# Patient Record
Sex: FEMALE | Race: WHITE | NOT HISPANIC OR LATINO | Employment: FULL TIME | URBAN - METROPOLITAN AREA
[De-identification: names, ages, dates, MRNs, and addresses within clinical notes are randomized per-mention and may not be internally consistent; named-entity substitution may affect disease eponyms.]

---

## 2018-04-25 ENCOUNTER — CONVERSION ENCOUNTER (OUTPATIENT)
Dept: MAMMOGRAPHY | Facility: HOSPITAL | Age: 48
End: 2018-04-25

## 2018-11-13 ENCOUNTER — OFFICE VISIT CONVERTED (OUTPATIENT)
Dept: OTHER | Facility: HOSPITAL | Age: 48
End: 2018-11-13
Attending: NURSE PRACTITIONER

## 2019-01-03 ENCOUNTER — HOSPITAL ENCOUNTER (OUTPATIENT)
Dept: OTHER | Facility: HOSPITAL | Age: 49
Discharge: HOME | End: 2019-01-03
Attending: NURSE PRACTITIONER

## 2019-01-03 LAB
T4 FREE SERPL-MCNC: 1 NG/DL (ref 0.9–1.8)
TSH SERPL-ACNC: 5.18 M[IU]/L (ref 0.27–4.2)

## 2019-01-07 LAB
CONV ANTI MICROSOMAL AB: 344 IU/ML (ref 0–34)
THYROGLOBULIN ANTIBODY: 3.9 IU/ML (ref 0–0.9)

## 2019-04-15 ENCOUNTER — CONVERSION ENCOUNTER (OUTPATIENT)
Dept: OTHER | Facility: HOSPITAL | Age: 49
End: 2019-04-15

## 2019-04-15 ENCOUNTER — HOSPITAL ENCOUNTER (OUTPATIENT)
Dept: OTHER | Facility: HOSPITAL | Age: 49
Discharge: HOME | End: 2019-04-15
Attending: NURSE PRACTITIONER

## 2019-04-15 ENCOUNTER — OFFICE VISIT CONVERTED (OUTPATIENT)
Dept: OTHER | Facility: HOSPITAL | Age: 49
End: 2019-04-15
Attending: NURSE PRACTITIONER

## 2019-04-16 LAB
T4 FREE SERPL-MCNC: 1.6 NG/DL (ref 0.9–1.8)
TSH SERPL-ACNC: 0.42 M[IU]/L (ref 0.27–4.2)

## 2019-05-29 ENCOUNTER — CONVERSION ENCOUNTER (OUTPATIENT)
Dept: OTHER | Facility: HOSPITAL | Age: 49
End: 2019-05-29

## 2019-05-29 ENCOUNTER — OFFICE VISIT CONVERTED (OUTPATIENT)
Dept: OTHER | Facility: HOSPITAL | Age: 49
End: 2019-05-29
Attending: NURSE PRACTITIONER

## 2019-07-03 ENCOUNTER — OFFICE VISIT CONVERTED (OUTPATIENT)
Dept: OTHER | Facility: HOSPITAL | Age: 49
End: 2019-07-03
Attending: NURSE PRACTITIONER

## 2019-07-03 ENCOUNTER — CONVERSION ENCOUNTER (OUTPATIENT)
Dept: OTHER | Facility: HOSPITAL | Age: 49
End: 2019-07-03

## 2019-08-07 ENCOUNTER — HOSPITAL ENCOUNTER (OUTPATIENT)
Dept: MAMMOGRAPHY | Facility: HOSPITAL | Age: 49
Discharge: HOME | End: 2019-08-07
Attending: NURSE PRACTITIONER

## 2019-08-19 ENCOUNTER — HOSPITAL ENCOUNTER (OUTPATIENT)
Dept: MAMMOGRAPHY | Facility: HOSPITAL | Age: 49
Discharge: HOME | End: 2019-08-19
Attending: NURSE PRACTITIONER

## 2019-09-10 ENCOUNTER — OFFICE VISIT CONVERTED (OUTPATIENT)
Dept: GASTROENTEROLOGY | Facility: CLINIC | Age: 49
End: 2019-09-10
Attending: PHYSICIAN ASSISTANT

## 2019-09-25 ENCOUNTER — HOSPITAL ENCOUNTER (OUTPATIENT)
Dept: GASTROENTEROLOGY | Facility: HOSPITAL | Age: 49
Setting detail: HOSPITAL OUTPATIENT SURGERY
Discharge: HOME | End: 2019-09-25
Attending: INTERNAL MEDICINE

## 2019-12-26 ENCOUNTER — OFFICE VISIT CONVERTED (OUTPATIENT)
Dept: GASTROENTEROLOGY | Facility: CLINIC | Age: 49
End: 2019-12-26
Attending: PHYSICIAN ASSISTANT

## 2020-01-22 ENCOUNTER — CONVERSION ENCOUNTER (OUTPATIENT)
Dept: OTHER | Facility: HOSPITAL | Age: 50
End: 2020-01-22

## 2020-01-22 ENCOUNTER — OFFICE VISIT CONVERTED (OUTPATIENT)
Dept: OTHER | Facility: HOSPITAL | Age: 50
End: 2020-01-22
Attending: NURSE PRACTITIONER

## 2020-01-22 ENCOUNTER — HOSPITAL ENCOUNTER (OUTPATIENT)
Dept: OTHER | Facility: HOSPITAL | Age: 50
Discharge: HOME | End: 2020-01-22
Attending: NURSE PRACTITIONER

## 2020-01-22 LAB
ALBUMIN SERPL-MCNC: 4.2 G/DL (ref 3.5–5)
ALBUMIN/GLOB SERPL: 1.4 {RATIO} (ref 1.4–2.6)
ALP SERPL-CCNC: 91 U/L (ref 53–141)
ALT SERPL-CCNC: 11 U/L (ref 10–40)
ANION GAP SERPL CALC-SCNC: 14 MMOL/L (ref 8–19)
AST SERPL-CCNC: 17 U/L (ref 15–50)
BASOPHILS # BLD AUTO: 0.05 10*3/UL (ref 0–0.2)
BASOPHILS NFR BLD AUTO: 0.9 % (ref 0–3)
BILIRUB SERPL-MCNC: 0.29 MG/DL (ref 0.2–1.3)
BUN SERPL-MCNC: 6 MG/DL (ref 5–25)
BUN/CREAT SERPL: 9 {RATIO} (ref 6–20)
CALCIUM SERPL-MCNC: 9 MG/DL (ref 8.7–10.4)
CHLORIDE SERPL-SCNC: 104 MMOL/L (ref 99–111)
CHOLEST SERPL-MCNC: 167 MG/DL (ref 107–200)
CHOLEST/HDLC SERPL: 3.4 {RATIO} (ref 3–6)
CONV ABS IMM GRAN: 0.01 10*3/UL (ref 0–0.2)
CONV CO2: 27 MMOL/L (ref 22–32)
CONV IMMATURE GRAN: 0.2 % (ref 0–1.8)
CONV TOTAL PROTEIN: 7.1 G/DL (ref 6.3–8.2)
CREAT UR-MCNC: 0.66 MG/DL (ref 0.5–0.9)
DEPRECATED RDW RBC AUTO: 46.2 FL (ref 36.4–46.3)
EOSINOPHIL # BLD AUTO: 0.13 10*3/UL (ref 0–0.7)
EOSINOPHIL # BLD AUTO: 2.3 % (ref 0–7)
ERYTHROCYTE [DISTWIDTH] IN BLOOD BY AUTOMATED COUNT: 12.6 % (ref 11.7–14.4)
GFR SERPLBLD BASED ON 1.73 SQ M-ARVRAT: >60 ML/MIN/{1.73_M2}
GLOBULIN UR ELPH-MCNC: 2.9 G/DL (ref 2–3.5)
GLUCOSE SERPL-MCNC: 89 MG/DL (ref 65–99)
HCT VFR BLD AUTO: 36.7 % (ref 37–47)
HDLC SERPL-MCNC: 49 MG/DL (ref 40–60)
HGB BLD-MCNC: 12.2 G/DL (ref 12–16)
LDLC SERPL CALC-MCNC: 99 MG/DL (ref 70–100)
LYMPHOCYTES # BLD AUTO: 2.29 10*3/UL (ref 1–5)
LYMPHOCYTES NFR BLD AUTO: 41 % (ref 20–45)
MCH RBC QN AUTO: 33.4 PG (ref 27–31)
MCHC RBC AUTO-ENTMCNC: 33.2 G/DL (ref 33–37)
MCV RBC AUTO: 100.5 FL (ref 81–99)
MONOCYTES # BLD AUTO: 0.44 10*3/UL (ref 0.2–1.2)
MONOCYTES NFR BLD AUTO: 7.9 % (ref 3–10)
NEUTROPHILS # BLD AUTO: 2.67 10*3/UL (ref 2–8)
NEUTROPHILS NFR BLD AUTO: 47.7 % (ref 30–85)
NRBC CBCN: 0 % (ref 0–0.7)
OSMOLALITY SERPL CALC.SUM OF ELEC: 289 MOSM/KG (ref 273–304)
PLATELET # BLD AUTO: 356 10*3/UL (ref 130–400)
PMV BLD AUTO: 10.9 FL (ref 9.4–12.3)
POTASSIUM SERPL-SCNC: 3.8 MMOL/L (ref 3.5–5.3)
RBC # BLD AUTO: 3.65 10*6/UL (ref 4.2–5.4)
SODIUM SERPL-SCNC: 141 MMOL/L (ref 135–147)
T4 FREE SERPL-MCNC: 1.1 NG/DL (ref 0.9–1.8)
TRIGL SERPL-MCNC: 93 MG/DL (ref 40–150)
TSH SERPL-ACNC: 5.56 M[IU]/L (ref 0.27–4.2)
VLDLC SERPL-MCNC: 19 MG/DL (ref 5–37)
WBC # BLD AUTO: 5.59 10*3/UL (ref 4.8–10.8)

## 2020-02-19 ENCOUNTER — OFFICE VISIT CONVERTED (OUTPATIENT)
Dept: OTHER | Facility: HOSPITAL | Age: 50
End: 2020-02-19
Attending: NURSE PRACTITIONER

## 2020-02-19 ENCOUNTER — CONVERSION ENCOUNTER (OUTPATIENT)
Dept: OTHER | Facility: HOSPITAL | Age: 50
End: 2020-02-19

## 2020-06-02 ENCOUNTER — OFFICE VISIT CONVERTED (OUTPATIENT)
Dept: OTHER | Facility: HOSPITAL | Age: 50
End: 2020-06-02
Attending: NURSE PRACTITIONER

## 2020-06-02 ENCOUNTER — CONVERSION ENCOUNTER (OUTPATIENT)
Dept: OTHER | Facility: HOSPITAL | Age: 50
End: 2020-06-02

## 2020-06-09 ENCOUNTER — CONVERSION ENCOUNTER (OUTPATIENT)
Dept: OTHER | Facility: HOSPITAL | Age: 50
End: 2020-06-09

## 2020-06-09 ENCOUNTER — OFFICE VISIT CONVERTED (OUTPATIENT)
Dept: OTHER | Facility: HOSPITAL | Age: 50
End: 2020-06-09
Attending: NURSE PRACTITIONER

## 2020-06-23 ENCOUNTER — CONVERSION ENCOUNTER (OUTPATIENT)
Dept: OTHER | Facility: HOSPITAL | Age: 50
End: 2020-06-23

## 2020-06-23 ENCOUNTER — OFFICE VISIT CONVERTED (OUTPATIENT)
Dept: OTHER | Facility: HOSPITAL | Age: 50
End: 2020-06-23
Attending: NURSE PRACTITIONER

## 2020-10-27 ENCOUNTER — HOSPITAL ENCOUNTER (OUTPATIENT)
Dept: MAMMOGRAPHY | Facility: HOSPITAL | Age: 50
Discharge: HOME | End: 2020-10-27
Attending: NURSE PRACTITIONER

## 2021-05-13 NOTE — PROGRESS NOTES
"   Progress Note      Patient Name: Abril Hinkle   Patient ID: [id number]   Sex: Female   YOB: 1970    Primary Care Provider: Tiarra JOHNSON    Visit Date: June 9, 2020    Provider: ALEX Cuadra   Location: [facility]   Location Address: [address] [place] [address]  [id number]   Location Phone: [phone]          Chief Complaint  · Follow up      History Of Present Illness  Abril Hinkle is a 49 year old /White female who presents for evaluation and treatment of:      1 week follow up on Hypertension and Headache. Has not been able to  maxalt due to [facility] pharmacy being out of stock. Headache have improved, today has head pressure. Currently taking Lisinopril 20mg 2/daily. States blood pressure is never consistent. Last visit was increased from once daily to twice daily. Usha states that she has anxiety all the time. \"I worry about dumb things all the time.\"       Past Medical History  Disease Name Date Onset Notes   Acne --  --    Anxiety --  --    Arthritis --  --    Cold sore --  --    CTS (carpal tunnel syndrome) --  --    GERD --  --    Hernia --  --    Hypothyroidism 04/15/2019 --    Migraines --  --    Night sweats --  --    Ruptured Breast Implant --  --    Screening Mammogram 8/2019 --          Past Surgical History  Procedure Name Date Notes   Appendectomy --  --    Breast augmentation --  --    Colonoscopy 9/2019 --    EGD 2019 --    Tonsilectomy --  --          Medication List  Name Date Started Instructions   gabapentin 300 mg oral capsule 01/22/2020 take 1 capsule by oral route every 12 hours as needed for 30 days   levothyroxine 75 mcg oral tablet 01/23/2020 take 1 tablet (75 mcg) by oral route once daily for 30 days   lisinopril 20 mg oral tablet 06/02/2020 take 2 tablets by oral route daily for 30 days   Maxalt 10 mg oral tablet 06/02/2020 take 1 tablet (10 mg) by oral route once, may repeat at 2 hour intervals X1 Dose   omeprazole 40 " "mg oral capsule,delayed release(DR/EC) 2020 TAKE 1 CAPSULE BY MOUTH ONCE DAILY BEFORE A MEAL for 30 days   temazepam 15 mg oral capsule 2020 take 1 capsule (15 mg) by oral route once daily at bedtime as needed for 30 days         Allergy List  Allergen Name Date Reaction Notes   No known history of drug allergy --  --  --        Allergies Reconciled  Family Medical History  Disease Name Relative/Age Notes   Esophagus Neoplasm, Malignant Father/65   Father  age 66   Colon Neoplasm, Malignant Uncle/   maternal uncle colon ca         Social History  Finding Status Start/Stop Quantity Notes   Alcohol Never --/-- --  does not drink   lives with parents --  --/-- --  --    Single --  --/-- --  --    Tobacco Former --/-- --  former smoker  04/10/2017 - 2017 - 2017 - 2017 - 2016 -    Working --  --/-- --  --          Immunizations  NameDate Admin Mfg Trade Name Lot Number Route Inj VIS Given VIS Publication   [id number]/22/2020 [employer] Fl[employer]  NE NE 2020    Comments: date estimated   [id number]/2018 [employer]  NE NE 10/01/2018    Comments:          Review of Systems  · Constitutional  o Denies  o : sick contacts, fever, chills, fatigue, weakness  · Cardiovascular  o Denies  o : dypnea on exertion, pain in chest  · Respiratory  o Denies  o : shortness of breath, cough  · Gastrointestinal  o Denies  o : diarrhea, constipation  · Genitourinary  o Denies  o : urgency, frequency  · Neurologic  o Denies  o : headache  · Psychiatric  o Admits  o : anxiety      Vitals  Date Time BP Position Site L\R Cuff Size HR RR TEMP (F) WT  HT  BMI kg/m2 BSA m2 O2 Sat HC       2020 10:39 /92 Sitting    69 - R 16 98.8 142lbs 4oz 5'  5\" 23.67 1.72 100 %          Physical Examination  · Constitutional  o Appearance  o : well-nourished, well developed, alert, in no acute distress, well-tended appearance  · Head and Face  o Head  o :   § Inspection  § : " atraumatic, normocephalic  · Eyes  o Eyes  o : extraocular movements intact, no scleral icterus, no conjunctival injection  · Respiratory  o Respiratory Effort  o : breathing comfortably, symmetric chest rise  o Auscultation of Lungs  o : clear to asculatation bilaterally, no wheezes, rales, or rhonchii  · Cardiovascular  o Heart  o :   § Auscultation of Heart  § : regular rate and rhythm, no murmurs, rubs, or gallops  o Peripheral Vascular System  o :   § Extremities  § : no edema  · Gastrointestinal  o Abdominal Examination  o :   § Abdomen  § : bowel sounds present, non-distended, non-tender  · Skin and Subcutaneous Tissue  o General Inspection  o : no lesions present, no areas of discoloration, skin turgor normal  · Neurologic  o Mental Status Examination  o :   § Orientation  § : grossly oriented to person, place and time  o Gait and Station  o :   § Gait Screening  § : normal gait  · Psychiatric  o General  o : normal mood and affect          Assessment  · Anxiety disorder     300.00/F41.9  · Essential hypertension     401.9/I10  · Headache     784.0/R51  · Insomnia, unspecified     780.52/G47.00    Problems Reconciled  Plan  · Orders  o ACO-39: Current medications updated and reviewed () - - 06/09/2020  · Medications  o hydroxyzine HCl 10 mg oral tablet   SIG: Take 1 tab Q6 Hours PRN for anxiety, ok to take 2 at night for sleep.   DISP: (90) tablets with 1 refills  Prescribed on 06/09/2020     o sumatriptan succinate 50 mg oral tablet   SIG: take 1 tablet once with fluids as early as possible after the onset of a migraine attack may repeat after 2 hours if headache returns,   DISP: (9) tablets with 5 refills  Prescribed on 06/09/2020     o Lexapro 10 mg oral tablet   SIG: take 1 tablet (10 mg) by oral route once daily for 30 days   DISP: (30) tablets with 1 refills  Prescribed on 06/09/2020     o lisinopril 20 mg oral tablet   SIG: take 2 tablets by oral route daily for 30 days   DISP: (60) tablet with 1  refills  Refilled on 06/09/2020     o Maxalt 10 mg oral tablet   SIG: take 1 tablet (10 mg) by oral route once, may repeat at 2 hour intervals X1 Dose   DISP: (9) tablets with 5 refills  Discontinued on 06/09/2020     o Medications have been Reconciled  o Transition of Care or Provider Policy  · Instructions  o Patient advised to monitor blood pressure (B/P) at home and journal readings. Patient informed that a B/P reading at home of more than 130/80 is considered hypertension. For readings greater [id number]/90 or higher patient is advised to follow up in the office with readings for management. Patient advised to limit sodium intake.  o Take all medications as prescribed/directed.  o Patient was educated/instructed on their diagnosis, treatment and medications prior to discharge from the clinic today.  o Call the office with any concerns or questions.  o At this time we will direct the patient to take 20 mg of lisinopril in the morning and 20 mg at night to see if we can get better control over her blood pressure. Advised her to continue to monitor her blood pressure as she has been at home.  o We will also start patient on Lexapro 10 mg once anxiety, advised patient of potential side effects and benefits of treatment. We will give her hydroxyzine 10 mg every 6 hours for panic also instructed that she can take 2 tablets at night for sleep. Patient voiced understanding.  o She has been on backorder with her Maxalt so at this time we will switch her to sumatriptan 50 mg. For migraine a repeat in 2 hours if needed. Instructions given to patient.  · Disposition  o Follow up in 2 weeks     I will see Joi sherman in 2 weeks to reevaluate blood pressure and anxiety.    EMR dragon/transcription disclaimer: Much of this encounter note is an electronic transcription/translation of spoken language to printed text.  Electronic translation of spoken language may permit erroneous, or at times nonsensical words or phrases to be  inadvertently transcribed; although I have reviewed the note for such errors, some may still exist.             Electronically Signed by: ALEX Cuadra -Author on June 9, 2020 10:38:49 AM

## 2021-05-13 NOTE — PROGRESS NOTES
Progress Note      Patient Name: Abril Hinkle   Patient ID: [id number]   Sex: Female   YOB: 1970    Primary Care Provider: Tiarra JOHNSON    Visit Date: June 2, 2020    Provider: ALEX Cuadra   Location: [facility]   Location Address: [address] [place] [address]  [id number]   Location Phone: [phone]          Chief Complaint  · [facility]      History Of Present Illness  Abril Hinkle is a 49 year old /White female who presents for evaluation and treatment of:      Patient here for [facility]. Was seen at [facility] this past weekend for elevated blood pressure and headache, was started on Lisinopril 20mg 1/daily. Additional symptoms include ear pain and neck/throat pressure. Admits to [facility] chest pressure. Has been having headaches for a while admits to worse since last Wednesday. headache started when she gets up in the morning and will last throughout the day off and on, does state that she has been taking Ibuprofen 800mg daily for the last few weeks.     *Called [facility] care and requested for medical records to be faxed to our office       Past Medical History  Disease Name Date Onset Notes   Acne --  --    Anxiety --  --    Arthritis --  --    Cold sore --  --    CTS (carpal tunnel syndrome) --  --    GERD --  --    Hernia --  --    Hypothyroidism 04/15/2019 --    Migraines --  --    Night sweats --  --    Ruptured Breast Implant --  --    Screening Mammogram 8/2019 --          Past Surgical History  Procedure Name Date Notes   Appendectomy --  --    Breast augmentation --  --    Colonoscopy 9/2019 --    EGD 2019 --    Tonsilectomy --  --          Medication List  Name Date Started Instructions   gabapentin 300 mg oral capsule 01/22/2020 take 1 capsule by oral route every 12 hours as needed for 30 days   levothyroxine 75 mcg oral tablet 01/23/2020 take 1 tablet (75 mcg) by oral route once daily for 30 days  "  lisinopril 20 mg oral tablet 2020 take 2 tablets by oral route daily for 30 days   omeprazole 40 mg oral capsule,delayed release(/EC) 2020 TAKE 1 CAPSULE BY MOUTH ONCE DAILY BEFORE A MEAL for 30 days   temazepam 15 mg oral capsule 2020 take 1 capsule (15 mg) by oral route once daily at bedtime as needed for 30 days         Allergy List  Allergen Name Date Reaction Notes   No known history of drug allergy --  --  --        Allergies Reconciled  Family Medical History  Disease Name Relative/Age Notes   Esophagus Neoplasm, Malignant Father/65   Father  age 66   Colon Neoplasm, Malignant Uncle/   maternal uncle colon ca         Social History  Finding Status Start/Stop Quantity Notes   Alcohol Never --/-- --  does not drink   lives with parents --  --/-- --  --    Single --  --/-- --  --    Tobacco Former --/-- --  former smoker  04/10/2017 - 2017 - 2017 - 2017 - 2016 -    Working --  --/-- --  --          Immunizations  NameDate Admin Mfg Trade Name Lot Number Route Inj VIS Given VIS Publication   [id number]/22/2020 [employer]  NE NE 2020    Comments: date estimated   Imkjrelmb54/2018 [employer]  NE NE 10/01/2018    Comments:          Review of Systems  · Constitutional  o Admits  o : dizziness  o Denies  o : fever, chills, fatigue, weakness  · HENT  o Admits  o : tinnitus  o Denies  o : hearing loss, sore throat, ear drainage  · Cardiovascular  o Denies  o : dypnea on exertion, pain in chest  · Respiratory  o Denies  o : shortness of breath  · Gastrointestinal  o Denies  o : diarrhea, constipation  · Genitourinary  o Denies  o : urgency, frequency  · Neurologic  o Admits  o : headache      Vitals  Date Time BP Position Site L\R Cuff Size HR RR TEMP (F) WT  HT  BMI kg/m2 BSA m2 O2 Sat HC       2020 12:29 /80 Sitting    70 - R 16 99 142lbs 2oz 5'  5\" 23.65 1.72 98 %          Physical " Examination  · Constitutional  o Appearance  o : well-nourished, well developed, alert, in no acute distress, well-tended appearance  · Head and Face  o Head  o :   § Inspection  § : atraumatic, normocephalic  · Eyes  o Eyes  o : extraocular movements intact, no scleral icterus, no conjunctival injection  · Ears, Nose, Mouth and Throat  o Ears  o :   § External Ears  § : normal  § Otoscopic Examination  § : fluid bubbles present behind tympanic membrane-right ear   o Nose  o :   § Intranasal Exam  § : nares patent  o Oral Cavity  o :   § Oral Mucosa  § : moist mucous membranes  o Throat  o :   § Oropharynx  § : no inflammation or lesions present, tonsils within normal limits  · Respiratory  o Respiratory Effort  o : breathing comfortably, symmetric chest rise  o Auscultation of Lungs  o : clear to asculatation bilaterally, no wheezes, rales, or rhonchii  · Cardiovascular  o Heart  o :   § Auscultation of Heart  § : regular rate and rhythm, no murmurs, rubs, or gallops  o Peripheral Vascular System  o :   § Extremities  § : no edema  · Gastrointestinal  o Abdominal Examination  o :   § Abdomen  § : bowel sounds present, non-distended, non-tender  · Lymphatic  o Neck  o : no lymphadenopathy present  · Skin and Subcutaneous Tissue  o General Inspection  o : no lesions present, no areas of discoloration, skin turgor normal  · Neurologic  o Mental Status Examination  o :   § Orientation  § : grossly oriented to person, place and time  o Gait and Station  o :   § Gait Screening  § : normal gait  · Psychiatric  o General  o : normal mood and affect              Assessment  · Essential hypertension     401.9/I10  · Headache     784.0/R51    Problems Reconciled  Plan  · Orders  o ACO-39: Current medications updated and reviewed () - - 06/02/2020  o ACO-14: Influenza immunization administered or previously received () - - 06/02/2020  · Medications  o Maxalt 10 mg oral tablet   SIG: take 1 tablet (10 mg) by oral  route once, may repeat at 2 hour intervals X1 Dose   DISP: (9) tablets with 5 refills  Prescribed on 06/02/2020     o Medications have been Reconciled  o Transition of Care or Provider Policy  · Instructions  o Patient advised to monitor blood pressure (B/P) at home and journal readings. Patient informed that a B/P reading at home of more than 130/80 is considered hypertension. For readings greater hvbx029/90 or higher patient is advised to follow up in the office with readings for management. Patient advised to limit sodium intake.  o Take all medications as prescribed/directed.  o Patient was educated/instructed on their diagnosis, treatment and medications prior to discharge from the clinic today.  o Call the office with any concerns or questions.  o Bring all medicines with their bottles to each office visit.  o Increase patient's lisinopril to 40 mg once daily, advised her to stop all ibuprofen, Aleve and Motrin as this may be contributing to her hypertension. Also encouraged her to avoid caffeine. Okay to use to 325 mg aspirin with an extra strength Tylenol, if there is no relief here she can use the Maxalt. Patient voiced understanding and all questions answered.  · Disposition  o Follow up in 1 weeks     We will see Joi back in 1 week to re-evaluate her Blood pressure and headache.     EMR dragon/transcription disclaimer: Much of this encounter note is an electronic transcription/translation of spoken language to printed text.  Electronic translation of spoken language may permit erroneous, or at times nonsensical words or phrases to be inadvertently transcribed; although I have reviewed the note for such errors, some may still exist.             Electronically Signed by: ALEX Cuadra -Author on June 2, 2020 12:48:28 PM

## 2021-05-15 VITALS
HEART RATE: 69 BPM | OXYGEN SATURATION: 100 % | BODY MASS INDEX: 23.7 KG/M2 | WEIGHT: 142.25 LBS | RESPIRATION RATE: 16 BRPM | TEMPERATURE: 98.8 F | SYSTOLIC BLOOD PRESSURE: 132 MMHG | DIASTOLIC BLOOD PRESSURE: 92 MMHG | HEIGHT: 65 IN

## 2021-05-15 VITALS
BODY MASS INDEX: 24.16 KG/M2 | RESPIRATION RATE: 16 BRPM | HEIGHT: 65 IN | OXYGEN SATURATION: 99 % | TEMPERATURE: 98.3 F | SYSTOLIC BLOOD PRESSURE: 118 MMHG | DIASTOLIC BLOOD PRESSURE: 64 MMHG | HEART RATE: 64 BPM | WEIGHT: 145 LBS

## 2021-05-15 VITALS
HEART RATE: 70 BPM | HEIGHT: 65 IN | WEIGHT: 142.12 LBS | OXYGEN SATURATION: 98 % | BODY MASS INDEX: 23.68 KG/M2 | TEMPERATURE: 99 F | RESPIRATION RATE: 16 BRPM | SYSTOLIC BLOOD PRESSURE: 142 MMHG | DIASTOLIC BLOOD PRESSURE: 80 MMHG

## 2021-05-15 VITALS
HEIGHT: 65 IN | SYSTOLIC BLOOD PRESSURE: 142 MMHG | RESPIRATION RATE: 16 BRPM | WEIGHT: 144 LBS | OXYGEN SATURATION: 98 % | TEMPERATURE: 98.7 F | HEART RATE: 66 BPM | BODY MASS INDEX: 23.99 KG/M2 | DIASTOLIC BLOOD PRESSURE: 102 MMHG

## 2021-05-15 VITALS
HEIGHT: 65 IN | HEART RATE: 68 BPM | RESPIRATION RATE: 16 BRPM | TEMPERATURE: 98.6 F | BODY MASS INDEX: 24.49 KG/M2 | DIASTOLIC BLOOD PRESSURE: 80 MMHG | SYSTOLIC BLOOD PRESSURE: 128 MMHG | WEIGHT: 147 LBS | OXYGEN SATURATION: 99 %

## 2021-05-15 VITALS
HEART RATE: 76 BPM | WEIGHT: 146.12 LBS | BODY MASS INDEX: 24.35 KG/M2 | TEMPERATURE: 98.8 F | HEIGHT: 65 IN | DIASTOLIC BLOOD PRESSURE: 90 MMHG | RESPIRATION RATE: 16 BRPM | OXYGEN SATURATION: 100 % | SYSTOLIC BLOOD PRESSURE: 120 MMHG

## 2021-05-15 VITALS
SYSTOLIC BLOOD PRESSURE: 133 MMHG | RESPIRATION RATE: 16 BRPM | DIASTOLIC BLOOD PRESSURE: 75 MMHG | HEART RATE: 77 BPM | WEIGHT: 147 LBS | BODY MASS INDEX: 24.49 KG/M2 | HEIGHT: 65 IN | OXYGEN SATURATION: 99 %

## 2021-05-15 VITALS
RESPIRATION RATE: 16 BRPM | SYSTOLIC BLOOD PRESSURE: 134 MMHG | WEIGHT: 142 LBS | HEIGHT: 65 IN | DIASTOLIC BLOOD PRESSURE: 81 MMHG | HEART RATE: 67 BPM | OXYGEN SATURATION: 100 % | BODY MASS INDEX: 23.66 KG/M2

## 2021-05-15 VITALS
HEIGHT: 65 IN | WEIGHT: 144 LBS | SYSTOLIC BLOOD PRESSURE: 130 MMHG | TEMPERATURE: 98.8 F | BODY MASS INDEX: 23.99 KG/M2 | HEART RATE: 69 BPM | OXYGEN SATURATION: 98 % | RESPIRATION RATE: 16 BRPM | DIASTOLIC BLOOD PRESSURE: 82 MMHG

## 2021-05-15 VITALS
DIASTOLIC BLOOD PRESSURE: 74 MMHG | SYSTOLIC BLOOD PRESSURE: 104 MMHG | HEART RATE: 71 BPM | TEMPERATURE: 98.8 F | HEIGHT: 65 IN | WEIGHT: 140 LBS | OXYGEN SATURATION: 97 % | RESPIRATION RATE: 18 BRPM | BODY MASS INDEX: 23.32 KG/M2

## 2021-05-16 VITALS
WEIGHT: 142 LBS | OXYGEN SATURATION: 96 % | HEART RATE: 76 BPM | TEMPERATURE: 98.2 F | HEIGHT: 65 IN | SYSTOLIC BLOOD PRESSURE: 102 MMHG | RESPIRATION RATE: 16 BRPM | BODY MASS INDEX: 23.66 KG/M2 | DIASTOLIC BLOOD PRESSURE: 80 MMHG

## 2021-12-14 ENCOUNTER — TRANSCRIBE ORDERS (OUTPATIENT)
Dept: ADMINISTRATIVE | Facility: HOSPITAL | Age: 51
End: 2021-12-14

## 2021-12-14 DIAGNOSIS — Z12.31 VISIT FOR SCREENING MAMMOGRAM: Primary | ICD-10-CM

## 2022-01-04 ENCOUNTER — HOSPITAL ENCOUNTER (OUTPATIENT)
Dept: MAMMOGRAPHY | Facility: HOSPITAL | Age: 52
Discharge: HOME | End: 2022-01-04
Admitting: NURSE PRACTITIONER

## 2022-01-04 DIAGNOSIS — Z12.31 VISIT FOR SCREENING MAMMOGRAM: ICD-10-CM

## 2022-01-04 PROCEDURE — 77063 BREAST TOMOSYNTHESIS BI: CPT

## 2022-01-04 PROCEDURE — 77067 SCR MAMMO BI INCL CAD: CPT

## 2023-03-28 ENCOUNTER — TRANSCRIBE ORDERS (OUTPATIENT)
Dept: ADMINISTRATIVE | Facility: HOSPITAL | Age: 53
End: 2023-03-28
Payer: COMMERCIAL

## 2023-03-28 DIAGNOSIS — Z12.31 VISIT FOR SCREENING MAMMOGRAM: Primary | ICD-10-CM

## 2023-05-09 ENCOUNTER — HOSPITAL ENCOUNTER (OUTPATIENT)
Dept: MAMMOGRAPHY | Facility: HOSPITAL | Age: 53
Discharge: HOME | End: 2023-05-09
Admitting: NURSE PRACTITIONER
Payer: COMMERCIAL

## 2023-05-09 DIAGNOSIS — Z12.31 VISIT FOR SCREENING MAMMOGRAM: ICD-10-CM

## 2023-05-09 PROCEDURE — 77067 SCR MAMMO BI INCL CAD: CPT

## 2023-05-09 PROCEDURE — 77063 BREAST TOMOSYNTHESIS BI: CPT

## 2025-06-06 ENCOUNTER — TRANSCRIBE ORDERS (OUTPATIENT)
Dept: ADMINISTRATIVE | Facility: HOSPITAL | Age: 55
End: 2025-06-06
Payer: COMMERCIAL

## 2025-06-06 DIAGNOSIS — Z12.31 OTHER SCREENING MAMMOGRAM: Primary | ICD-10-CM

## 2025-06-26 ENCOUNTER — HOSPITAL ENCOUNTER (OUTPATIENT)
Dept: MAMMOGRAPHY | Facility: HOSPITAL | Age: 55
Discharge: HOME | End: 2025-06-26
Admitting: NURSE PRACTITIONER
Payer: COMMERCIAL

## 2025-06-26 DIAGNOSIS — Z12.31 OTHER SCREENING MAMMOGRAM: ICD-10-CM

## 2025-06-26 PROCEDURE — 77067 SCR MAMMO BI INCL CAD: CPT

## 2025-06-26 PROCEDURE — 77063 BREAST TOMOSYNTHESIS BI: CPT
